# Patient Record
Sex: FEMALE | Employment: OTHER | URBAN - METROPOLITAN AREA
[De-identification: names, ages, dates, MRNs, and addresses within clinical notes are randomized per-mention and may not be internally consistent; named-entity substitution may affect disease eponyms.]

---

## 2018-08-27 ENCOUNTER — TRANSFERRED RECORDS (OUTPATIENT)
Dept: HEALTH INFORMATION MANAGEMENT | Facility: CLINIC | Age: 58
End: 2018-08-27

## 2018-09-19 ENCOUNTER — OFFICE VISIT (OUTPATIENT)
Dept: OPHTHALMOLOGY | Facility: CLINIC | Age: 58
End: 2018-09-19
Payer: COMMERCIAL

## 2018-09-19 DIAGNOSIS — H51.11 CONVERGENCE INSUFFICIENCY: ICD-10-CM

## 2018-09-19 DIAGNOSIS — H43.812 POSTERIOR VITREOUS DETACHMENT, LEFT EYE: ICD-10-CM

## 2018-09-19 DIAGNOSIS — F07.81 POSTCONCUSSION SYNDROME: ICD-10-CM

## 2018-09-19 DIAGNOSIS — H53.10 SUBJECTIVE VISUAL DISTURBANCE OF BOTH EYES: Primary | ICD-10-CM

## 2018-09-19 ASSESSMENT — CONF VISUAL FIELD
OS_SUPERIOR_TEMPORAL_RESTRICTION: 3
OS_INFERIOR_NASAL_RESTRICTION: 3
OS_SUPERIOR_NASAL_RESTRICTION: 3
METHOD: COUNTING FINGERS
OS_INFERIOR_TEMPORAL_RESTRICTION: 3
OD_SUPERIOR_TEMPORAL_RESTRICTION: 3

## 2018-09-19 ASSESSMENT — REFRACTION_WEARINGRX
OD_SPHERE: -1.75
OD_AXIS: 075
OD_SPHERE: -2.25
OD_CYLINDER: +0.25
OD_AXIS: 175
OS_SPHERE: -2.00
OS_CYLINDER: +0.50
OS_AXIS: 175
OS_CYLINDER: -0.50
OD_CYLINDER: -0.50
OS_AXIS: 080
OS_SPHERE: -2.50

## 2018-09-19 ASSESSMENT — TONOMETRY
OS_IOP_MMHG: 15
OD_IOP_MMHG: 15
IOP_METHOD: ICARE

## 2018-09-19 ASSESSMENT — VISUAL ACUITY
OD_CC: 20/20-3
CORRECTION_TYPE: GLASSES
OS_CC: 20/25
METHOD: SNELLEN - LINEAR

## 2018-09-19 ASSESSMENT — EXTERNAL EXAM - RIGHT EYE: OD_EXAM: NORMAL

## 2018-09-19 ASSESSMENT — SLIT LAMP EXAM - LIDS
COMMENTS: NORMAL
COMMENTS: NORMAL

## 2018-09-19 ASSESSMENT — EXTERNAL EXAM - LEFT EYE: OS_EXAM: NORMAL

## 2018-09-19 NOTE — MR AVS SNAPSHOT
After Visit Summary   9/19/2018    Kayla Don    MRN: [id number]           Patient Information     Date Of Birth          1960        Visit Information        Provider Department      9/19/2018 9:20 AM Kranthi Alvarez, CRISTHIAN M [facility] Ophthalmology        Today's Diagnoses     Subjective visual disturbance of both eyes    -  1    Posterior vitreous detachment, left eye          Care Instructions    I would like you to see Dr. Sea Driscoll to evaluate the floater in your left eye. I am hoping that this helps stimulate this eye to see better and improve your visual function.    I would also like you to see Dr. John East as well. He is a neuro-ophthalmologist and I would like him to evaluate you for what is called torsional double vision. This may be related to an alignment of your eyes or may also be a processing problem. I am hopeful that they are able to measure how much your eyes deviate and hopefully improve your vision.              Follow-ups after your visit        Your next 10 appointments already scheduled     Oct 04, 2018  2:30 PM CDT   NEW GENERAL with Sea Driscoll MD   Eye Clinic ([facility])    [address] [address]   [phone]            Oct 05, 2018 10:00 AM CDT   New Adult Strabismus with John East MD   Eye Clinic ([facility])    [address] [address]   [phone]              Who to contact     Please call your clinic at [phone] to:    Ask questions about your health    Make or cancel appointments    Discuss your medicines    Learn about your test results    Speak to your doctor            Additional Information About Your Visit        [employer] Information     [employer] is an electronic gateway that provides easy, online access to your medical records. With [employer], you can request a  clinic appointment, read your test results, renew a prescription or communicate with your care team.     To sign up for [email or web address] visit the website at [email or web address]   You will be asked to enter the access code listed below, as well as some personal information. Please follow the directions to create your username and password.     Your access code is: [email or web address]  Expires: 2018  6:30 AM     Your access code will  in 90 days. If you need help or a new code, please contact your [facility] or call [phone] for assistance.        Care EveryWhere ID     This is your Care EveryWhere ID. This could be used by other organizations to access your [facility] medical records  [id number]         Blood Pressure from Last 3 Encounters:   13 105/61    Weight from Last 3 Encounters:   13 56.6 kg (124 lb 11.2 oz)              Today, you had the following     No orders found for display       Primary Care Provider Office Phone # Fax #    Susan Cartagena [phone]       [facility] [address] [facility] [address] [place] [address]        Equal Access to Services     [facility]: Hadii aad ku hadasho Soomaali, waaxda luqadaha, qaybta kaalmada adeegyada, waxay tcin haydarlenen monse beal lashira ah. So [facility] [phone].    ATENCIÓN: Si habla español, tiene a iverson disposición servicios gratuitos de asistencia lingüística. Llame al [phone].    We comply with applicable federal civil rights laws and [place] laws. We do not discriminate on the basis of race, color, national origin, age, disability, sex, sexual orientation, or gender identity.            Thank you!     Thank you for choosing [facility] OPHTHALMOLOGY  for your care. Our goal is always to provide you with excellent care. Hearing back from our patients is one way we can continue to improve our services. Please take a few minutes to complete the written survey that you may receive in the  mail after your visit with us. Thank you!             Your Updated Medication List - Protect others around you: Learn how to safely use, store and throw away your medicines at www.disposemymeds.org.          This list is accurate as of 9/19/18 10:41 AM.  Always use your most recent med list.                   Brand Name Dispense Instructions for use Diagnosis    acetaminophen-isometheptene-dichloralphenazone -325 MG per capsule   Generic drug:  isometheptene-dichloralphenazone-acetaminophen      Take 0.5 capsules by mouth every 4 hours as needed.        Sonatype CONTOUR test strip   Generic drug:  blood glucose monitoring      by In Vitro route daily. As prn        BLOOD GLUCOSE METER      1-2 times daily        CALCIUM PO      Take  by mouth. 300 mg 1 tablet BID        ibuprofen 200 MG tablet    ADVIL/MOTRIN     Take 200 mg by mouth every 4 hours as needed.        Krill Oil 1000 MG Caps      Take 1 capsule by mouth daily.        LANCETS THIN      1-2 daily        LEVALBUTEROL HCL      1-2 puffs. prn        magnesium oxide 400 MG tablet   Generic drug:  magnesium oxide      Take 1 tablet by mouth daily.        MULTIVITAL PO      Take  by mouth. 1 tablet daily        nitroGLYcerin 0.4 MG sublingual tablet    NITROSTAT     Place 1 tablet under the tongue every 5 minutes as needed.        TYLENOL 325 MG tablet   Generic drug:  acetaminophen      Take 325-650 mg by mouth every 6 hours as needed.        Vitamin D3 2000 units Tabs      Take  by mouth. 1 tablet daily

## 2018-09-19 NOTE — PATIENT INSTRUCTIONS
I would like you to see Dr. Sea Driscoll to evaluate the floater in your left eye. I am hoping that this helps stimulate this eye to see better and improve your visual function.    I would also like you to see Dr. John East as well. He is a neuro-ophthalmologist and I would like him to evaluate you for what is called torsional double vision. This may be related to an alignment of your eyes or may also be a processing problem. I am hopeful that they are able to measure how much your eyes deviate and hopefully improve your vision.

## 2018-09-19 NOTE — PROGRESS NOTES
Assessment/Plan  (H53.10) Subjective visual disturbance of both eyes  (primary encounter diagnosis)  Comment: Some question of torsional diplopia vs. non-organic vision loss. Patient previously was evaluated at [facility] for similar complaints. While her visual clarity was improved, her new glasses gave her new headaches. Adjusting prism amount has not helped to this point. No significant improvement with pinhole.  Plan: Discussed findings with patient. Recommended neuro-ophthalmic evaluation to rule out any subtle nerve palsy. While EOM's appeared full on exam today there appeared to be some misalignment (particularly in superior gaze, questionably worse on left head tilt).     (H43.812) Posterior vitreous detachment, left eye  Comment: Question how Hartman ring/cloud is impacting patient's binocularity  Plan: Recommend evaluation with Dr. Driscoll for YAG vitreolysis. Advised patient that consultation does not guarantee treatment will occur.     (F07.81) Postconcussion syndrome  Plan: See above note.     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 Would rule out torsional diplopia before adjusting base-in prism again.       Complete documentation of historical and exam elements from today's encounter can  be found in the full encounter summary report (not reduplicated in this progress  note). I personally obtained the chief complaint(s) and history of present illness. I  confirmed and edited as necessary the review of systems, past medical/surgical  history, family history, social history, and examination findings as documented by  others; and I examined the patient myself. I personally reviewed the relevant tests,  images, and reports as documented above. I formulated and edited as necessary the  assessment and plan and discussed the findings and management plan with the  patient and family.    Kranthi  Antonio, OD

## 2018-09-26 ENCOUNTER — TELEPHONE (OUTPATIENT)
Dept: OPHTHALMOLOGY | Facility: CLINIC | Age: 58
End: 2018-09-26

## 2018-09-30 DIAGNOSIS — H53.2 DOUBLE VISION: ICD-10-CM

## 2018-09-30 DIAGNOSIS — H53.10 SUBJECTIVE VISUAL DISTURBANCE: ICD-10-CM

## 2018-10-03 ENCOUNTER — OFFICE VISIT (OUTPATIENT)
Dept: OPHTHALMOLOGY | Facility: CLINIC | Age: 58
End: 2018-10-03
Attending: OPHTHALMOLOGY
Payer: COMMERCIAL

## 2018-10-03 DIAGNOSIS — H53.10 SUBJECTIVE VISUAL DISTURBANCE: ICD-10-CM

## 2018-10-03 DIAGNOSIS — H53.2 DOUBLE VISION: ICD-10-CM

## 2018-10-03 PROCEDURE — G0463 HOSPITAL OUTPT CLINIC VISIT: HCPCS | Mod: ZF | Performed by: TECHNICIAN/TECHNOLOGIST

## 2018-10-03 ASSESSMENT — EXTERNAL EXAM - RIGHT EYE: OD_EXAM: NORMAL

## 2018-10-03 ASSESSMENT — CONF VISUAL FIELD
OS_SUPERIOR_NASAL_RESTRICTION: 3
OD_INFERIOR_NASAL_RESTRICTION: 3
OS_INFERIOR_NASAL_RESTRICTION: 3
OD_INFERIOR_TEMPORAL_RESTRICTION: 3
OD_SUPERIOR_NASAL_RESTRICTION: 3
OD_SUPERIOR_TEMPORAL_RESTRICTION: 3
OS_SUPERIOR_TEMPORAL_RESTRICTION: 3
OS_INFERIOR_TEMPORAL_RESTRICTION: 3

## 2018-10-03 ASSESSMENT — TONOMETRY
OS_IOP_MMHG: 16
IOP_METHOD: ICARE
OD_IOP_MMHG: 15

## 2018-10-03 ASSESSMENT — REFRACTION_WEARINGRX
OD_HBASE: IN
OS_CYLINDER: +0.50
SPECS_TYPE: SVL
OS_HPRISM: 0.5
OS_AXIS: 170
OS_HBASE: IN
OS_SPHERE: -2.50
OD_HPRISM: 0.5
OD_CYLINDER: +0.50
OD_AXIS: 165
OD_SPHERE: -2.25

## 2018-10-03 ASSESSMENT — VISUAL ACUITY
OD_CC+: -2
OD_CC: 20/30
OS_CC+: -1
OS_CC: 20/30
CORRECTION_TYPE: GLASSES
METHOD: SNELLEN - LINEAR

## 2018-10-03 ASSESSMENT — SLIT LAMP EXAM - LIDS
COMMENTS: NORMAL
COMMENTS: NORMAL

## 2018-10-03 ASSESSMENT — EXTERNAL EXAM - LEFT EYE: OS_EXAM: NORMAL

## 2018-10-03 ASSESSMENT — CUP TO DISC RATIO
OS_RATIO: .4
OD_RATIO: .4
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
## 2018-10-03 NOTE — MR AVS SNAPSHOT
After Visit Summary   10/3/2018    Kayla Don    MRN: [id number]           Patient Information     Date Of Birth          1960        Visit Information        Provider Department      10/3/2018 2:30 PM John East MD Eye Clinic        Today's Diagnoses     Double vision        Subjective visual disturbance - Both Eyes           Follow-ups after your visit        Your next 10 appointments already scheduled     2019 10:15 AM CDT   RETURN GENERAL with Sea Driscoll MD   Eye Clinic ([facility])    [address]  [facility] [address] [place] [address]   [phone]              Who to contact     Please call your clinic at [phone] to:    Ask questions about your health    Make or cancel appointments    Discuss your medicines    Learn about your test results    Speak to your doctor            Additional Information About Your Visit        MyChart Information     moneymeetst is an electronic gateway that provides easy, online access to your medical records. With [employer], you can request a clinic appointment, read your test results, renew a prescription or communicate with your care team.     To sign up for [email or web address] visit the website at [email or web address]   You will be asked to enter the access code listed below, as well as some personal information. Please follow the directions to create your username and password.     Your access code is: [email or web address]  Expires: 2018  6:30 AM     Your access code will  in 90 days. If you need help or a new code, please contact your [facility] or call [phone] for assistance.        Care EveryWhere ID     This is your Care EveryWhere ID. This could be used by other organizations to access your [facility] medical records  [id number]         Blood Pressure from Last 3 Encounters:   13 105/61    Weight from Last 3 Encounters:    02/14/13 56.6 kg (124 lb 11.2 oz)              We Performed the Following     IOP Measurement        Primary Care Provider Office Phone # Fax #    Susan Cartagena [phone]       [facility] [address] [facility] [address] [place] [address]        Equal Access to Services     [facility] : Hadii aad ku hadasho Soomaali, waaxda luqadaha, qaybta kaalmada adeegyada, waxay idiin hayaan adeeg lian laDanyaan ah. So [facility] [phone].    ATENCIÓN: Si habla español, tiene a iverson disposición servicios gratuitos de asistencia lingüística. [facility] [phone].    We comply with applicable federal civil rights laws and [place] laws. We do not discriminate on the basis of race, color, national origin, age, disability, sex, sexual orientation, or gender identity.            Thank you!     Thank you for choosing EYE CLINIC  for your care. Our goal is always to provide you with excellent care. Hearing back from our patients is one way we can continue to improve our services. Please take a few minutes to complete the written survey that you may receive in the mail after your visit with us. Thank you!             Your Updated Medication List - Protect others around you: Learn how to safely use, store and throw away your medicines at www.disposemymeds.org.          This list is accurate as of 10/3/18 11:59 PM.  Always use your most recent med list.                   Brand Name Dispense Instructions for use Diagnosis    acetaminophen-isometheptene-dichloralphenazone -325 MG per capsule   Generic drug:  isometheptene-dichloralphenazone-acetaminophen      Take 0.5 capsules by mouth every 4 hours as needed.        VIOLETA CONTOUR test strip   Generic drug:  blood glucose monitoring      by In Vitro route daily. As prn        BLOOD GLUCOSE METER      1-2 times daily        CALCIUM PO      Take  by mouth. 300 mg 1 tablet BID        ibuprofen 200 MG tablet    ADVIL/MOTRIN     Take 200 mg by mouth every 4 hours as  needed.        Krill Oil 1000 MG Caps      Take 1 capsule by mouth daily.        LANCETS THIN      1-2 daily        LEVALBUTEROL HCL      1-2 puffs. prn        magnesium oxide 400 MG tablet   Generic drug:  magnesium oxide      Take 1 tablet by mouth daily.        MULTIVITAL PO      Take  by mouth. 1 tablet daily        nitroGLYcerin 0.4 MG sublingual tablet    NITROSTAT     Place 1 tablet under the tongue every 5 minutes as needed.        TYLENOL 325 MG tablet   Generic drug:  acetaminophen      Take 325-650 mg by mouth every 6 hours as needed.        Vitamin D3 2000 units Tabs      Take  by mouth. 1 tablet daily

## 2018-10-04 ENCOUNTER — OFFICE VISIT (OUTPATIENT)
Dept: OPHTHALMOLOGY | Facility: CLINIC | Age: 58
End: 2018-10-04
Attending: OPHTHALMOLOGY
Payer: COMMERCIAL

## 2018-10-04 DIAGNOSIS — H53.10 SUBJECTIVE VISUAL DISTURBANCE: Primary | ICD-10-CM

## 2018-10-04 DIAGNOSIS — D31.31 CHOROIDAL NEVUS OF RIGHT EYE: ICD-10-CM

## 2018-10-04 DIAGNOSIS — H35.52 MACULAR PIGMENT DEPOSIT: ICD-10-CM

## 2018-10-04 PROCEDURE — G0463 HOSPITAL OUTPT CLINIC VISIT: HCPCS | Mod: ZF

## 2018-10-04 PROCEDURE — 92134 CPTRZ OPH DX IMG PST SGM RTA: CPT | Mod: ZF | Performed by: OPHTHALMOLOGY

## 2018-10-04 PROCEDURE — 92250 FUNDUS PHOTOGRAPHY W/I&R: CPT | Mod: 59,ZF | Performed by: OPHTHALMOLOGY

## 2018-10-04 ASSESSMENT — TONOMETRY
IOP_METHOD: TONOPEN
OD_IOP_MMHG: 13
OS_IOP_MMHG: 14

## 2018-10-04 ASSESSMENT — VISUAL ACUITY
OS_CC+: -2
METHOD: SNELLEN - LINEAR
OD_CC: 20/25
OD_CC+: -2
CORRECTION_TYPE: GLASSES
OS_CC: 20/25

## 2018-10-04 ASSESSMENT — CONF VISUAL FIELD
OS_NORMAL: 1
OD_NORMAL: 1

## 2018-10-04 ASSESSMENT — REFRACTION_WEARINGRX
OS_HBASE: IN
OS_HPRISM: 0.5
OD_HPRISM: 0.5
OD_HBASE: IN
OD_CYLINDER: +0.50
OS_SPHERE: -2.50
OD_AXIS: 165
OS_AXIS: 170
SPECS_TYPE: SVL
OS_CYLINDER: +0.50
OD_SPHERE: -2.25

## 2018-10-04 ASSESSMENT — SLIT LAMP EXAM - LIDS
COMMENTS: NORMAL
COMMENTS: NORMAL

## 2018-10-04 ASSESSMENT — CUP TO DISC RATIO
OD_RATIO: .4
OS_RATIO: .4

## 2018-10-04 ASSESSMENT — EXTERNAL EXAM - RIGHT EYE: OD_EXAM: NORMAL

## 2018-10-04 ASSESSMENT — EXTERNAL EXAM - LEFT EYE: OS_EXAM: NORMAL

## 2018-10-04 NOTE — LETTER
10/4/2018       RE: Kayla Don  [address] [facility] [address]     Dear Colleagues,    Thank you for referring your patient, Kayla Don, to the EYE CLINIC at [facility]. Please see a copy of my visit note below.    Assessment & Plan      Kayla Don is a 58 year old female with the following diagnoses:   1. Subjective visual disturbance    2. Macular pigment deposit    3. Choroidal nevus of right eye         Referral from Dr. Alvarez/Cruzito to assess for possible LFT.  Notes central smudge/dark spot that may move with ocular movement.  Present for some time and not changing.  Rare flashes in both eyes     Partial posterior vitreous detachment (PVD) noted left eye.  No identifiable floater that would be amenable to laser at this time.  Some debris seen on OCT near fovea which may explain symptoms, but too close to retina for laser.      Right eye with incidental nevus, photos obtained.  Appears benign, annual monitoring recommended    Patient disposition:   Return in about 6 months (around 4/4/2019) for DFE, OCT Macula.      Attending Physician Attestation:  Complete documentation of historical and exam elements from today's encounter can be found in the full encounter summary report (not reduplicated in this progress note).  I personally obtained the chief complaint(s) and history of present illness.  I confirmed and edited as necessary the review of systems, past medical/surgical history, family history, social history, and examination findings as documented by others; and I examined the patient myself.  I personally reviewed the relevant tests, images, and reports as documented above.  I formulated and edited as necessary the assessment and plan and discussed the findings and management plan with the patient and family. . - Sea Driscoll MD         Again, thank you for allowing me to participate in the care of your patient.       Sincerely,    Sea Driscoll MD

## 2018-10-04 NOTE — NURSING NOTE
Chief Complaints and History of Present Illnesses   Patient presents with     Consult For     YAG vitreolysis evaluation      HPI    Affected eye(s):  Both   Symptoms:        Duration:  1 year   Frequency:  Constant       Do you have eye pain now?:  No      Comments:  Pt. States that she has been seeing a floater in central vision LE for the last 2 months.  Seems to be interfering with vision.  Does have showers of floaters RE that go away completely in between episodes.   Naida Delaney COT 2:47 PM October 4, 2018

## 2018-10-04 NOTE — PROGRESS NOTES
Assessment & Plan      Kayla Don is a 58 year old female with the following diagnoses:   1. Subjective visual disturbance    2. Macular pigment deposit    3. Choroidal nevus of right eye         Referral from Dr. Alvarez/Cruzito to assess for possible LFT.  Notes central smudge/dark spot that may move with ocular movement.  Present for some time and not changing.  Rare flashes in both eyes     Partial posterior vitreous detachment (PVD) noted left eye.  No identifiable floater that would be amenable to laser at this time.  Some debris seen on OCT near fovea which may explain symptoms, but too close to retina for laser.      Right eye with incidental nevus, photos obtained.  Appears benign, annual monitoring recommended    Patient disposition:   Return in about 6 months (around 4/4/2019) for DFE, OCT Macula.      Attending Physician Attestation:  Complete documentation of historical and exam elements from today's encounter can be found in the full encounter summary report (not reduplicated in this progress note).  I personally obtained the chief complaint(s) and history of present illness.  I confirmed and edited as necessary the review of systems, past medical/surgical history, family history, social history, and examination findings as documented by others; and I examined the patient myself.  I personally reviewed the relevant tests, images, and reports as documented above.  I formulated and edited as necessary the assessment and plan and discussed the findings and management plan with the patient and family. . - Sea Driscoll MD

## 2018-10-04 NOTE — MR AVS SNAPSHOT
After Visit Summary   10/4/2018    Kayla Don    MRN: [id number]           Patient Information     Date Of Birth          1960        Visit Information        Provider Department      10/4/2018 2:30 PM Sea Driscoll MD Eye Clinic        Today's Diagnoses     Subjective visual disturbance    -  1    Macular pigment deposit           Follow-ups after your visit        Your next 10 appointments already scheduled     2019 10:15 AM CDT   RETURN GENERAL with Sea Driscoll MD   Eye Clinic ([facility])    [address]  9[facility] [address] [place] [id number]   [phone]              Who to contact     Please call your clinic at [phone] to:    Ask questions about your health    Make or cancel appointments    Discuss your medicines    Learn about your test results    Speak to your doctor            Additional Information About Your Visit        MyChart Information     Rennoviat is an electronic gateway that provides easy, online access to your medical records. With Affinegy, you can request a clinic appointment, read your test results, renew a prescription or communicate with your care team.     To sign up for Rennoviat visit the website at [email or web address]   You will be asked to enter the access code listed below, as well as some personal information. Please follow the directions to create your username and password.     Your access code is: [email or web address]  Expires: 2018  6:30 AM     Your access code will  in 90 days. If you need help or a new code, please contact your [facility] or call [phone] for assistance.        Care EveryWhere ID     This is your Care EveryWhere ID. This could be used by other organizations to access your [facility] medical records  [id number]         Blood Pressure from Last 3 Encounters:   13 105/61    Weight from Last 3 Encounters:    02/14/13 56.6 kg (124 lb 11.2 oz)              We Performed the Following     Fundus Photos OU (both eyes)     OCT Retina Spectralis OU (both eyes)        Primary Care Provider Office Phone # Fax Chin Cartagena [phone]       [facility] [address] [place] [address]        Equal Access to Services     [facility]: Hadii aad ku hadasho Soomaali, waaxda luqadaha, qaybta kaalmada adeegyada, waxay idiin hayaan adeeg kharash la'aan . So [facility] [phone].    ATENCIÓN: Si habla catracho, tiene a iverson disposición servicios gratuitos de asistencia lingüística. Isamar al [phone].    We comply with applicable federal civil rights laws and [place] laws. We do not discriminate on the basis of race, color, national origin, age, disability, sex, sexual orientation, or gender identity.            Thank you!     Thank you for choosing EYE CLINIC  for your care. Our goal is always to provide you with excellent care. Hearing back from our patients is one way we can continue to improve our services. Please take a few minutes to complete the written survey that you may receive in the mail after your visit with us. Thank you!             Your Updated Medication List - Protect others around you: Learn how to safely use, store and throw away your medicines at www.disposemymeds.org.          This list is accurate as of 10/4/18  3:49 PM.  Always use your most recent med list.                   Brand Name Dispense Instructions for use Diagnosis    acetaminophen-isometheptene-dichloralphenazone -325 MG per capsule   Generic drug:  isometheptene-dichloralphenazone-acetaminophen      Take 0.5 capsules by mouth every 4 hours as needed.        VIOLETA CONTOUR test strip   Generic drug:  blood glucose monitoring      by In Vitro route daily. As prn        BLOOD GLUCOSE METER      1-2 times daily        CALCIUM PO      Take  by mouth. 300 mg 1 tablet BID        ibuprofen 200 MG tablet     ADVIL/MOTRIN     Take 200 mg by mouth every 4 hours as needed.        Krill Oil 1000 MG Caps      Take 1 capsule by mouth daily.        LANCETS THIN      1-2 daily        LEVALBUTEROL HCL      1-2 puffs. prn        magnesium oxide 400 MG tablet   Generic drug:  magnesium oxide      Take 1 tablet by mouth daily.        MULTIVITAL PO      Take  by mouth. 1 tablet daily        nitroGLYcerin 0.4 MG sublingual tablet    NITROSTAT     Place 1 tablet under the tongue every 5 minutes as needed.        TYLENOL 325 MG tablet   Generic drug:  acetaminophen      Take 325-650 mg by mouth every 6 hours as needed.        Vitamin D3 2000 units Tabs      Take  by mouth. 1 tablet daily

## 2019-04-03 DIAGNOSIS — H53.10 SUBJECTIVE VISUAL DISTURBANCE: Primary | ICD-10-CM

## 2021-11-23 ENCOUNTER — TELEPHONE (OUTPATIENT)
Dept: PSYCHIATRY | Facility: CLINIC | Age: 61
End: 2021-11-23
Payer: COMMERCIAL

## 2022-04-07 ENCOUNTER — OFFICE VISIT (OUTPATIENT)
Dept: PSYCHIATRY | Facility: CLINIC | Age: 62
End: 2022-04-07
Payer: COMMERCIAL

## 2022-04-07 DIAGNOSIS — F33.2 SEVERE EPISODE OF RECURRENT MAJOR DEPRESSIVE DISORDER, WITHOUT PSYCHOTIC FEATURES (H): Primary | ICD-10-CM
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
## 2022-04-24 ENCOUNTER — HEALTH MAINTENANCE LETTER (OUTPATIENT)
Age: 62
End: 2022-04-24

## 2022-04-29 ENCOUNTER — VIRTUAL VISIT (OUTPATIENT)
Dept: PSYCHIATRY | Facility: CLINIC | Age: 62
End: 2022-04-29
Payer: COMMERCIAL

## 2022-04-29 DIAGNOSIS — F33.2 SEVERE EPISODE OF RECURRENT MAJOR DEPRESSIVE DISORDER, WITHOUT PSYCHOTIC FEATURES (H): Primary | ICD-10-CM

## 2022-04-29 RX ORDER — ONDANSETRON 8 MG/1
8 TABLET, ORALLY DISINTEGRATING ORAL PRN
COMMUNITY
Start: 2021-11-18

## 2022-04-29 RX ORDER — HYOSCYAMINE SULFATE 0.125 MG
0.12 TABLET,DISINTEGRATING ORAL PRN
COMMUNITY

## 2022-04-29 RX ORDER — DIAZEPAM 2 MG
2 TABLET ORAL 3 TIMES DAILY
COMMUNITY
Start: 2021-10-20

## 2022-04-29 RX ORDER — ESTRADIOL 0.1 MG/G
CREAM VAGINAL
COMMUNITY
Start: 2021-08-23

## 2022-04-29 ASSESSMENT — PATIENT HEALTH QUESTIONNAIRE - PHQ9: SUM OF ALL RESPONSES TO PHQ QUESTIONS 1-9: 18

## 2022-04-29 NOTE — PROGRESS NOTES
Kayla is a 61 year old who is being evaluated via a billable video visit.      How would you like to obtain your AVS? MyChart  If the video visit is dropped, the invitation should be resent by: Text to cell phone: [phone]  Will anyone else be joining your video visit? No      Video Start Time: 9:00 am  Video-Visit Details    Type of service:  Video Visit    Video End Time:10:05 am    Originating Location (pt. Location): Home    Distant Location (provider location):  [facility]     Platform used for Video Visit: Cometa     Depression Response    Patient completed the PHQ-9 assessment for depression and scored >9? Yes  Question 9 on the PHQ-9 was positive for suicidality? Yes, safety plans.   Does patient have current mental health provider? Yes    Is this a virtual visit? Yes   Does patient have suicidal ideation (positive question 9)? Yes (adult) - transfer to Red Flag Triage ([phone]) Patient declined transfer.  Notify provider.
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
## 2022-05-04 ENCOUNTER — OFFICE VISIT (OUTPATIENT)
Dept: PSYCHIATRY | Facility: CLINIC | Age: 62
End: 2022-05-04
Payer: COMMERCIAL

## 2022-05-04 VITALS — HEIGHT: 63 IN | BODY MASS INDEX: 23.85 KG/M2 | WEIGHT: 134.6 LBS

## 2022-05-04 DIAGNOSIS — F33.2 SEVERE EPISODE OF RECURRENT MAJOR DEPRESSIVE DISORDER, WITHOUT PSYCHOTIC FEATURES (H): Primary | ICD-10-CM

## 2022-05-04 DIAGNOSIS — F43.10 PTSD (POST-TRAUMATIC STRESS DISORDER): ICD-10-CM

## 2022-05-04 DIAGNOSIS — S06.9X9S TRAUMATIC BRAIN INJURY WITH LOSS OF CONSCIOUSNESS, SEQUELA (H): ICD-10-CM

## 2022-05-04 ASSESSMENT — PATIENT HEALTH QUESTIONNAIRE - PHQ9: SUM OF ALL RESPONSES TO PHQ QUESTIONS 1-9: 13
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
## 2022-05-09 ENCOUNTER — TELEPHONE (OUTPATIENT)
Dept: PSYCHIATRY | Facility: CLINIC | Age: 62
End: 2022-05-09
Payer: COMMERCIAL

## 2022-05-09 NOTE — TELEPHONE ENCOUNTER
What is the concern that needs to be addressed by a nurse? Any recommendation for a therapist? For CBT or Trauma therapy, was hoping to start working on getting set up with one before next appointment on 6/1    May a detailed message be left on voicemail? Yes    Date of last office visit: 5/4/22    Message routed to: [facility] RN Pool

## 2022-05-25 ENCOUNTER — TELEPHONE (OUTPATIENT)
Dept: PSYCHIATRY | Facility: CLINIC | Age: 62
End: 2022-05-25
Payer: COMMERCIAL

## 2022-05-25 NOTE — CONFIDENTIAL NOTE
Writer contacted [facility] to inquire if LINQ implant would be compatible with TMS. Nurse Colton was not able to provider answer at this time but will contact their [employer] rep, as well as cardiologist and call clinic back.    Patient provider notified.

## 2022-05-27 NOTE — CONFIDENTIAL NOTE
Received call from [facility] vasculature [facility] and spoke with RENETTA chilel, she provided vrebal order. That Kayla whom of which has a linq 2 loop recorder implanted is safe to receive TMS treatment.     Will update provider, and place on TMS waitlist.

## 2022-06-01 ENCOUNTER — OFFICE VISIT (OUTPATIENT)
Dept: PSYCHIATRY | Facility: CLINIC | Age: 62
End: 2022-06-01
Payer: COMMERCIAL

## 2022-06-01 VITALS
BODY MASS INDEX: 23.67 KG/M2 | HEART RATE: 84 BPM | HEIGHT: 63 IN | SYSTOLIC BLOOD PRESSURE: 120 MMHG | WEIGHT: 133.6 LBS | TEMPERATURE: 97.5 F | DIASTOLIC BLOOD PRESSURE: 79 MMHG

## 2022-06-01 DIAGNOSIS — F33.2 SEVERE EPISODE OF RECURRENT MAJOR DEPRESSIVE DISORDER, WITHOUT PSYCHOTIC FEATURES (H): Primary | ICD-10-CM

## 2022-06-01 ASSESSMENT — PATIENT HEALTH QUESTIONNAIRE - PHQ9: SUM OF ALL RESPONSES TO PHQ QUESTIONS 1-9: 11
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
## 2022-06-09 NOTE — TELEPHONE ENCOUNTER
Pt is interested in TMS, please call back to get more information. [place] Please call [place].

## 2022-06-09 NOTE — TELEPHONE ENCOUNTER
Writer returned call to patient.        Writer was able to answer all questions related to TMS.  Also reviewed her copay of $25 a day for TMS.  She will review all this information  And look at her TMS costs.  IF she does end up doing TMS she will need a morning appointment.

## 2022-06-13 ENCOUNTER — TELEPHONE (OUTPATIENT)
Dept: PSYCHIATRY | Facility: CLINIC | Age: 62
End: 2022-06-13
Payer: COMMERCIAL

## 2022-06-13 NOTE — TELEPHONE ENCOUNTER
Contacted patient to see about scheduling TMS; is considering cost and transportation before committing to tx. Would like to check in again at end of the month.

## 2022-06-28 ENCOUNTER — TELEPHONE (OUTPATIENT)
Dept: PSYCHIATRY | Facility: CLINIC | Age: 62
End: 2022-06-28

## 2022-06-28 NOTE — TELEPHONE ENCOUNTER
Called patient regarding TMS scheduling. She rescinded interest due to current COVID diagnosis along with concussion. Writer informed her she can call the clinic if she needs anything or wants to pursue TMS at a later date.

## 2022-11-19 ENCOUNTER — HEALTH MAINTENANCE LETTER (OUTPATIENT)
Age: 62
End: 2022-11-19

## 2023-05-18 ENCOUNTER — TRANSFERRED RECORDS (OUTPATIENT)
Dept: HEALTH INFORMATION MANAGEMENT | Facility: CLINIC | Age: 63
End: 2023-05-18
Payer: COMMERCIAL

## 2023-05-22 ENCOUNTER — TRANSCRIBE ORDERS (OUTPATIENT)
Dept: OTHER | Age: 63
End: 2023-05-22

## 2023-05-22 DIAGNOSIS — H53.9 VISUAL DISTURBANCE: Primary | ICD-10-CM

## 2023-05-22 DIAGNOSIS — S06.9XAA TBI (TRAUMATIC BRAIN INJURY) (H): ICD-10-CM

## 2023-05-30 ENCOUNTER — OFFICE VISIT (OUTPATIENT)
Dept: OPTOMETRY | Facility: CLINIC | Age: 63
End: 2023-05-30
Payer: COMMERCIAL

## 2023-05-30 DIAGNOSIS — F07.81 POSTCONCUSSION SYNDROME: Primary | ICD-10-CM

## 2023-05-30 DIAGNOSIS — H53.9 VISUAL DISTURBANCE: ICD-10-CM

## 2023-05-30 ASSESSMENT — VISUAL ACUITY
METHOD: SNELLEN - LINEAR
OD_CC: 20/25
CORRECTION_TYPE: GLASSES

## 2023-05-30 NOTE — PROGRESS NOTES
Assessment/Plan  (F07.81) Postconcussion syndrome  (primary encounter diagnosis)  Comment: Multiple head injuries reported. Symptoms fairly consistent to those reported in 2018. Historically normal neurology workup. Patient is well-established with PT.  Plan: Discussed findings with patient. Advised patient that ocular health appears to be within normal limits. Transient nature of her symptoms is more suggestive of functional/processing disorder than organic vision loss. Patient was encouraged to maintain care with neurology and her primary care physician. Continued work to improve sensitivity to stimuli will be needed, although patient should also take steps to reduce risks of additional injuries. Gradual return to primary activity levels was encouraged, although this may be difficult given exhaustion that reportedly occurs with mental and visual effort.     (H53.9) Visual disturbance  Plan: See above note. No follow up needed at this time. Return to clinic if symptoms worsen.           60 minutes were spent on the date of the encounter doing chart review, history and exam, documentation, and further activities as noted above.    Complete documentation of historical and exam elements from today's encounter can  be found in the full encounter summary report (not reduplicated in this progress  note). I personally obtained the chief complaint(s) and history of present illness. I  confirmed and edited as necessary the review of systems, past medical/surgical  history, family history, social history, and examination findings as documented by  others; and I examined the patient myself. I personally reviewed the relevant tests,  images, and reports as documented above. I formulated and edited as necessary the  assessment and plan and discussed the findings and management plan with the  patient and family.    Kranthi Alvarez OD

## 2023-06-01 ENCOUNTER — HEALTH MAINTENANCE LETTER (OUTPATIENT)
Age: 63
End: 2023-06-01

## 2023-06-01 ASSESSMENT — CONF VISUAL FIELD
OD_SUPERIOR_TEMPORAL_RESTRICTION: 3
OS_SUPERIOR_TEMPORAL_RESTRICTION: 3
OD_SUPERIOR_NASAL_RESTRICTION: 3
OD_INFERIOR_TEMPORAL_RESTRICTION: 3
OS_SUPERIOR_NASAL_RESTRICTION: 3
OD_INFERIOR_NASAL_RESTRICTION: 3
OS_INFERIOR_TEMPORAL_RESTRICTION: 3
OS_INFERIOR_NASAL_RESTRICTION: 3

## 2023-06-01 ASSESSMENT — EXTERNAL EXAM - LEFT EYE: OS_EXAM: NORMAL

## 2023-06-01 ASSESSMENT — CUP TO DISC RATIO
OD_RATIO: .4
OS_RATIO: .4

## 2023-06-01 ASSESSMENT — SLIT LAMP EXAM - LIDS
COMMENTS: NORMAL
COMMENTS: NORMAL

## 2023-06-01 ASSESSMENT — VISUAL ACUITY
OS_CC+: -2
OS_CC: 20/25

## 2023-06-01 ASSESSMENT — EXTERNAL EXAM - RIGHT EYE: OD_EXAM: NORMAL

## 2024-06-16 ENCOUNTER — HEALTH MAINTENANCE LETTER (OUTPATIENT)
Age: 64
End: 2024-06-16

## 2024-11-03 ENCOUNTER — HEALTH MAINTENANCE LETTER (OUTPATIENT)
Age: 64
End: 2024-11-03